# Patient Record
Sex: FEMALE | Race: WHITE | NOT HISPANIC OR LATINO | URBAN - METROPOLITAN AREA
[De-identification: names, ages, dates, MRNs, and addresses within clinical notes are randomized per-mention and may not be internally consistent; named-entity substitution may affect disease eponyms.]

---

## 2019-04-02 ENCOUNTER — IMPORTED ENCOUNTER (OUTPATIENT)
Dept: URBAN - METROPOLITAN AREA CLINIC 31 | Facility: CLINIC | Age: 24
End: 2019-04-02

## 2019-04-02 PROBLEM — H04.123: Noted: 2019-04-02

## 2019-04-02 PROCEDURE — 92310 CONTACT LENS FITTING OU: CPT

## 2019-04-02 PROCEDURE — 92015 DETERMINE REFRACTIVE STATE: CPT

## 2019-04-02 PROCEDURE — 92014 COMPRE OPH EXAM EST PT 1/>: CPT

## 2019-04-02 NOTE — PATIENT DISCUSSION
1.  Refractive error Annual Good ocular health documented. Discussed options of glasses contacts or refractive surgery. Discussed importance of annual eye exams. 2.  Chem keratitis OU:  Dispense Durezol BID OU for several days. Switch to Clear Care and Purilens. 3. Ref Consult appointment made.

## 2021-01-11 NOTE — PATIENT DISCUSSION
01/11/2021OS-0.50+1.37460+[id number]/20&nbsp;SN &nbsp; &nbsp; bmb
Comments:ANY COMBO
General:
Glasses Prescribed:
Lens Material:
Manual Manifest - Daily wearOD+0.50+0.93668+2.039360/20&nbsp;SN &nbsp; &nbsp; bmb
OD comment:UNLIKE SIGNS
POST OP YAG OS FOR PCO. PATIENT DOING WELL WITHOUT ANY SIGNS OF RETINAL HOLES OR TEARS. NEW SPEC RX GIVEN.
Slab Off:No
Vertex Distance:
spherecylinderaxisaddprismvertexVAInt VANVExaminer
unable to assess

## 2021-04-12 NOTE — PATIENT DISCUSSION
AMD (DRY), OU: MILD PROGRESSION. PRESCRIBE AREDS 2 VITAMINS / AMSLER GRID QD/ UV PROTECTION. SMOKING CESSATION EMPHASIZED. RETURN FOR FOLLOW-UP AS SCHEDULED. REFER TO DR Fahad Knox FOR EVAL AND TX WHEN PT RETURNS TO [place] IN OCTOBER.

## 2021-04-15 ENCOUNTER — IMPORTED ENCOUNTER (OUTPATIENT)
Dept: URBAN - METROPOLITAN AREA CLINIC 31 | Facility: CLINIC | Age: 26
End: 2021-04-15

## 2021-04-15 PROCEDURE — 92014 COMPRE OPH EXAM EST PT 1/>: CPT

## 2021-04-15 PROCEDURE — V2520 CONTACT LENS HYDROPHILIC: HCPCS

## 2021-04-15 PROCEDURE — 92310 CONTACT LENS FITTING OU: CPT

## 2022-04-02 ASSESSMENT — TONOMETRY
OD_IOP_MMHG: 15
OD_IOP_MMHG: 16
OS_IOP_MMHG: 16
OS_IOP_MMHG: 15

## 2022-04-02 ASSESSMENT — VISUAL ACUITY
OS_SC: 20/25-4
OS_CC: J1+14''
OS_CC: J114''
OD_SC: 20/25-2
OD_CC: J114''
OD_SC: 20/25
OD_CC: J1+14''
OS_SC: 20/20

## 2022-07-30 ENCOUNTER — TELEPHONE ENCOUNTER (OUTPATIENT)
Age: 27
End: 2022-07-30

## 2022-07-31 ENCOUNTER — TELEPHONE ENCOUNTER (OUTPATIENT)
Age: 27
End: 2022-07-31